# Patient Record
Sex: FEMALE | Race: WHITE | NOT HISPANIC OR LATINO | Employment: UNEMPLOYED | URBAN - METROPOLITAN AREA
[De-identification: names, ages, dates, MRNs, and addresses within clinical notes are randomized per-mention and may not be internally consistent; named-entity substitution may affect disease eponyms.]

---

## 2017-02-15 ENCOUNTER — HOSPITAL ENCOUNTER (EMERGENCY)
Facility: HOSPITAL | Age: 52
Discharge: HOME | End: 2017-02-16
Payer: MEDICAID

## 2017-02-15 DIAGNOSIS — S89.92XA LEFT KNEE INJURY, INITIAL ENCOUNTER: ICD-10-CM

## 2017-02-15 DIAGNOSIS — M25.569 KNEE PAIN, ACUTE: Primary | ICD-10-CM
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
## 2017-02-15 PROCEDURE — 63600175 PHARM REV CODE 636 W HCPCS: Performed by: NURSE PRACTITIONER

## 2017-02-15 PROCEDURE — 29505 APPLICATION LONG LEG SPLINT: CPT | Mod: LT

## 2017-02-15 PROCEDURE — 96372 THER/PROPH/DIAG INJ SC/IM: CPT

## 2017-02-15 PROCEDURE — 99283 EMERGENCY DEPT VISIT LOW MDM: CPT | Mod: 25

## 2017-02-15 RX ORDER — MORPHINE SULFATE 4 MG/ML
8 INJECTION, SOLUTION INTRAMUSCULAR; INTRAVENOUS
Status: COMPLETED | OUTPATIENT
Start: 2017-02-15 | End: 2017-02-15

## 2017-02-15 RX ORDER — PROMETHAZINE HYDROCHLORIDE 25 MG/ML
25 INJECTION, SOLUTION INTRAMUSCULAR; INTRAVENOUS
Status: COMPLETED | OUTPATIENT
Start: 2017-02-15 | End: 2017-02-15

## 2017-02-15 RX ORDER — MELOXICAM 15 MG/1
15 TABLET ORAL DAILY
Qty: 30 TABLET | Refills: 0 | Status: SHIPPED | OUTPATIENT
Start: 2017-02-15

## 2017-02-15 RX ADMIN — PROMETHAZINE HYDROCHLORIDE 25 MG: 25 INJECTION, SOLUTION INTRAMUSCULAR; INTRAVENOUS at 11:02

## 2017-02-15 RX ADMIN — MORPHINE SULFATE 8 MG: 4 INJECTION, SOLUTION INTRAMUSCULAR; INTRAVENOUS at 11:02

## 2017-02-15 NOTE — ED AVS SNAPSHOT
[facility]  [address] [facility] [address]               Chelsy Acosta   2/15/2017 11:18 PM   ED    Description:  Female : 1965   Department:  [facility]           Your Care was Coordinated By:     Provider Role From To    Reggie Calvo NP Nurse Practitioner 02/15/17 3352 --      Reason for Visit     Knee Pain           Diagnoses this Visit        Comments    Knee pain, acute    -  Primary     Left knee injury, initial encounter         Fall, initial encounter           ED Disposition     ED Disposition Condition Comment    Discharge             To Do List           Follow-up Information     Follow up with Scott Varghese MD In 2 days.    Specialty:  Family Medicine    Contact information:    [address] [facility] [address] [phone]          Schedule an appointment as soon as possible for a visit with Dawna - Orthopedics.    Specialty:  Orthopedics    Contact information:    [address] [facility] [address] [phone]    Additional information:    ([address]) 1st floor       These Medications        Disp Refills Start End    meloxicam (MOBIC) 15 MG tablet 30 tablet 0 2/15/2017     Take 1 tablet (15 mg total) by mouth once daily. With breakfast - Oral    Pharmacy: [facility] [id number] [facility] - [place], [place] -  [facility] #: [phone]         [facility]     [facility] -  Assistance  Registered nurses in the [facility] provide clinical advisement, health education, appointment booking, and other advisory services.  Call for this free service at [phone].             Medications           Message regarding Medications     Verify the changes and/or additions to your medication regime listed below are the same as discussed with your clinician today.  If any of these changes or additions are incorrect, please notify your healthcare  provider.        START taking these NEW medications        Refills    meloxicam (MOBIC) 15 MG tablet 0    Sig: Take 1 tablet (15 mg total) by mouth once daily. With breakfast    Class: Print    Route: Oral      These medications were administered today        Dose Freq    morphine injection 8 mg 8 mg ED 1 Time    Sig: Inject 2 mLs (8 mg total) into the muscle ED 1 Time.    Class: Normal    Route: Intramuscular    Cosign for Ordering: Required by Harmony Blunt MD    promethazine injection 25 mg 25 mg ED 1 Time    Sig: Inject 1 mL (25 mg total) into the muscle ED 1 Time.    Class: Normal    Route: Intramuscular           Verify that the below list of medications is an accurate representation of the medications you are currently taking.  If none reported, the list may be blank. If incorrect, please contact your healthcare provider. Carry this list with you in case of emergency.           Current Medications     albuterol (PROAIR HFA) 90 mcg/actuation inhaler Inhale 2 puffs into the lungs every 4 (four) hours as needed.    budesonide-formoterol 160-4.5 mcg (SYMBICORT) 160-4.5 mcg/actuation HFAA Inhale 2 puffs into the lungs every 12 (twelve) hours.    meloxicam (MOBIC) 15 MG tablet Take 1 tablet (15 mg total) by mouth once daily. With breakfast    morphine injection 8 mg Inject 2 mLs (8 mg total) into the muscle ED 1 Time.    promethazine injection 25 mg Inject 1 mL (25 mg total) into the muscle ED 1 Time.           Clinical Reference Information           Your Vitals Were     BP Pulse Temp Resp Height Weight    138/72 (BP Location: Right arm, Patient Position: Sitting) 89 98.4 °F (36.9 °C) (Oral) 20 5' (1.524 m) 77.1 kg (170 lb)    SpO2 BMI             96% 33.2 kg/m2         Allergies as of 2/15/2017     No Known Allergies      Immunizations Administered on Date of Encounter - 2/15/2017     None      ED Micro, Lab, POCT     None      ED Imaging Orders     Start Ordered       Status Ordering Provider    02/15/17 3734  02/15/17 2246  X-Ray Knee Complete 4 or More Views Left  1 time imaging      Final result         Discharge Instructions         Reducing Knee Pain and Swelling    Many treatments can help reduce pain and swelling in your knee. Your healthcare provider or physical therapist may suggest one or more of the following treatments:  · Icing your knee helps reduce swelling. You may be asked to ice your knee once a day or more. Apply ice for about 15 to 20 minutes at a time, with at least 40 minutes between sessions. Always keep a towel between the ice and your skin.   · Keeping your leg raised above your heart helps excess fluid flow out of your knee joint. This reduces swelling.  · Compression means wrapping an elastic bandage or neoprene sleeve snugly around your knees. This keeps fluid from collecting in your knee joint.  · Electrical stimulation, done by a physical therapist or , can help reduce excess fluid in your knee joint.  · Anti-inflammatory medicines may be prescribed by your healthcare provider. You may take pills or receive injections in your knee.  · Isometric (herber) exercises strengthen the muscles that support your knee joint. They also help reduce excess fluid in your knee.  · Massage helps fluid drain away from your knee.  Date Last Reviewed: 10/13/2015  © 2378-3935 [employer]. [address] [place] [address], [place] [address]. All rights reserved. This information is not intended as a substitute for professional medical care. Always follow your healthcare professional's instructions.          RICE     Rest an injury, elevate it, and use ice and compression as directed.   RICE stands for rest, ice, compression, and elevation. These can limit pain and swelling after an injury. RICE may be recommended to help treat fractures, sprains, strains, and bruises or bumps.   Home care  The following explain the details of RICE:  · Rest. Limit the use of the injured body part. This  helps prevent further damage to the body part and gives it time to heal. In some cases, you may need a sling, brace, splint, or cast to help keep the body part still until it has healed.  · Ice. Applying ice right after an injury helps relieve pain and swelling. Wrap a bag of ice in a thin towel. Then, place it over the injured area. Do this for 10 to 15 minutes every 3 to 4 hours. Continue for the next 1 to 3 days or until your symptoms improve. Never put ice directly on your skin or ice an area longer than 15 minutes at a time.  · Compression. Putting pressure on an injury helps reduce swelling and provides support. Wrap the injured area firmly with an elastic bandage/wrap. Make sure not to wrap the bandage too tightly or you will cut off blood flow to the injured area. If your bandage loosens, rewrap it.  · Elevation. Keeping an injury raised above the level of your heart reduces swelling, pain, and throbbing. For instance, if you have a broken leg, it may help to rest your leg on several pillows when sitting or lying down. Try to keep the injured area elevated for at least 2 to 3 hours per day.  Follow-up care  Follow up with your healthcare provider, or as advised.  When to seek medical advice  Call your healthcare provider right away if any of these occur:  · Fever of 100.4°F (38°C) or higher, or as directed by your healthcare provider  · Increased pain or swelling in the injured body part  · Injured body part becomes cold, blue, numb, or tingly  · Signs of infection. These include warmth in the skin, redness, drainage, or bad smell coming from the injured body part.  Date Last Reviewed: 1/18/2016  © 9567-6823 [employer]. [address] [place] [address], [place] [address]. All rights reserved. This information is not intended as a substitute for professional medical care. Always follow your healthcare professional's instructions.          [facility] Sign-Up     Activating your [facility] account is as easy  as 1-2-3!     1) Visit [email or web address], select Sign Up Now, enter this activation code and your date of birth, then select Next.  [email or web address]  Expires: 4/1/2017 11:36 PM      2) Create a username and password to use when you visit [facility] in the future and select a security question in case you lose your password and select Next.    3) Enter your e-mail address and click Sign Up!    Additional Information  If you have questions, please e-mail [email or web address] or call [phone] to talk to our [email or web address] staff. Remember, [facility] is NOT to be used for urgent needs. For medical emergencies, dial 911.          [facility] complies with applicable Federal civil rights laws and does not discriminate on the basis of race, color, national origin, age, disability, or sex.        Language Assistance Services     ATTENTION: Language assistance services are available, free of charge. Please call [phone].      ATENCIÓN: Si habla español, tiene a greco disposición servicios gratuitos de asistencia lingüística. Llame al [phone].     CHÚ Ý: N?u b?n nói Ti?ng Vi?t, có các d?ch v? h? tr? ngôn ng? mi?n phí dành cho b?n. G?i s? [phone].

## 2017-02-16 VITALS
SYSTOLIC BLOOD PRESSURE: 126 MMHG | RESPIRATION RATE: 18 BRPM | WEIGHT: 170 LBS | HEIGHT: 60 IN | DIASTOLIC BLOOD PRESSURE: 70 MMHG | BODY MASS INDEX: 33.38 KG/M2 | TEMPERATURE: 98 F | HEART RATE: 72 BPM | OXYGEN SATURATION: 98 %

## 2017-02-16 NOTE — DISCHARGE INSTRUCTIONS
Reducing Knee Pain and Swelling    Many treatments can help reduce pain and swelling in your knee. Your healthcare provider or physical therapist may suggest one or more of the following treatments:  · Icing your knee helps reduce swelling. You may be asked to ice your knee once a day or more. Apply ice for about 15 to 20 minutes at a time, with at least 40 minutes between sessions. Always keep a towel between the ice and your skin.   · Keeping your leg raised above your heart helps excess fluid flow out of your knee joint. This reduces swelling.  · Compression means wrapping an elastic bandage or neoprene sleeve snugly around your knees. This keeps fluid from collecting in your knee joint.  · Electrical stimulation, done by a physical therapist or , can help reduce excess fluid in your knee joint.  · Anti-inflammatory medicines may be prescribed by your healthcare provider. You may take pills or receive injections in your knee.  · Isometric (herber) exercises strengthen the muscles that support your knee joint. They also help reduce excess fluid in your knee.  · Massage helps fluid drain away from your knee.  Date Last Reviewed: 10/13/2015  © 5552-5305 [employer]. [address] [place] [address]. All rights reserved. This information is not intended as a substitute for professional medical care. Always follow your healthcare professional's instructions.          RICE     Rest an injury, elevate it, and use ice and compression as directed.   RICE stands for rest, ice, compression, and elevation. These can limit pain and swelling after an injury. RICE may be recommended to help treat fractures, sprains, strains, and bruises or bumps.   Home care  The following explain the details of RICE:  · Rest. Limit the use of the injured body part. This helps prevent further damage to the body part and gives it time to heal. In some cases, you may need a sling, brace, splint, or  cast to help keep the body part still until it has healed.  · Ice. Applying ice right after an injury helps relieve pain and swelling. Wrap a bag of ice in a thin towel. Then, place it over the injured area. Do this for 10 to 15 minutes every 3 to 4 hours. Continue for the next 1 to 3 days or until your symptoms improve. Never put ice directly on your skin or ice an area longer than 15 minutes at a time.  · Compression. Putting pressure on an injury helps reduce swelling and provides support. Wrap the injured area firmly with an elastic bandage/wrap. Make sure not to wrap the bandage too tightly or you will cut off blood flow to the injured area. If your bandage loosens, rewrap it.  · Elevation. Keeping an injury raised above the level of your heart reduces swelling, pain, and throbbing. For instance, if you have a broken leg, it may help to rest your leg on several pillows when sitting or lying down. Try to keep the injured area elevated for at least 2 to 3 hours per day.  Follow-up care  Follow up with your healthcare provider, or as advised.  When to seek medical advice  Call your healthcare provider right away if any of these occur:  · Fever of 100.4°F (38°C) or higher, or as directed by your healthcare provider  · Increased pain or swelling in the injured body part  · Injured body part becomes cold, blue, numb, or tingly  · Signs of infection. These include warmth in the skin, redness, drainage, or bad smell coming from the injured body part.  Date Last Reviewed: 1/18/2016  © 9322-2265 [employer]. [address] [place] [address], [place] [address]. All rights reserved. This information is not intended as a substitute for professional medical care. Always follow your healthcare professional's instructions.

## 2017-02-16 NOTE — ED PROVIDER NOTES
HISTORY     Chief Complaint   Patient presents with    Knee Pain     Trip/fall on L knee, significant surgical hx to L knee. +Swelling noted. Denies hitting head, no LOC     Review of patient's allergies indicates:  No Known Allergies     HPI   Patient is a 51 y.o. female presenting with the following complaint: fall. The history is provided by the patient.   Fall   The fall occurred while walking. She landed on a hard floor. The point of impact was the left knee. The pain is at a severity of 10/10. She was ambulatory at the scene. There was no entrapment after the fall. There was drug use (prescribed narcotics ) involved in the accident. There was no alcohol use involved in the accident. Pertinent negatives include no back pain, no fever and no nausea. The symptoms are aggravated by activity, use of the injured limb and pressure on the injury.        PCP: Scott Varghese MD     Past Medical History:  Past Medical History   Diagnosis Date    Asthma 11/22/2013    Chest congestion     Pneumonia         Past Surgical History:  No past surgical history on file.     Family History:  No family history on file.     Social History:  Social History     Social History Main Topics    Smoking status: Never Smoker    Smokeless tobacco: Not on file    Alcohol use No    Drug use: No    Sexual activity: No         ROS   Review of Systems   Constitutional: Negative for fever.   HENT: Negative for sore throat.    Respiratory: Negative for shortness of breath.    Cardiovascular: Negative for chest pain.   Gastrointestinal: Negative for nausea.   Genitourinary: Negative for dysuria.   Musculoskeletal: Negative for back pain.        Left knee pain/injury   Skin: Negative for rash.   Neurological: Negative for weakness.   Hematological: Does not bruise/bleed easily.       PHYSICAL EXAM   Initial Vitals   BP Pulse Resp Temp SpO2   02/15/17 2236 02/15/17 2236 02/15/17 2236 02/15/17 2236 02/15/17 2236   138/72 89 20 98.4 °F  (36.9 °C) 96 %       Physical Exam    Constitutional: She appears well-developed and well-nourished. No distress.   HENT:   Head: Normocephalic and atraumatic.   Eyes: Conjunctivae are normal. Pupils are equal, round, and reactive to light.   Neck: Normal range of motion. Neck supple.   Cardiovascular: Normal rate, regular rhythm, normal heart sounds and intact distal pulses.   Pulmonary/Chest: Breath sounds normal.   Abdominal: Soft. Bowel sounds are normal. She exhibits no distension. There is no tenderness. There is no rebound.   Musculoskeletal: She exhibits no edema.        Left knee: She exhibits decreased range of motion (due to pain), swelling and effusion. Tenderness found.   Neurological: She is alert and oriented to person, place, and time. She has normal strength.   Skin: Skin is warm and dry.   Psychiatric: She has a normal mood and affect.          ED COURSE   Orthopedic Injury  Date/Time: 2/15/2017 11:39 PM  Authorized by: CJ WEISS   Performed by: CJ WEISS  Injury location: knee  Location details: left knee  Injury type: soft tissue  Pre-procedure neurovascular assessment: neurovascularly intact  Pre-procedure range of motion: normal  Immobilization: brace and crutches  Supplies used: knee immoblizer   Complications: No  Specimens: No  Implants: No  Post-procedure neurovascular assessment: post-procedure neurovascularly intact  Patient tolerance: Patient tolerated the procedure well with no immediate complications        ED ONGOING VITALS:  Vitals:    02/15/17 2236   BP: 138/72   Pulse: 89   Resp: 20   Temp: 98.4 °F (36.9 °C)   TempSrc: Oral   SpO2: 96%   Weight: 77.1 kg (170 lb)   Height: 5' (1.524 m)         ABNORMAL LAB VALUES:  Labs Reviewed - No data to display      ALL LAB VALUES:        RADIOLOGY STUDIES:  Imaging Results         X-Ray Knee Complete 4 or More Views Left (Final result) Result time:  02/15/17 23:14:07    Final result by DENISE Oropeza Sr., MD (02/15/17 23:14:07)     Impression:      1.  There are small spurs in the medial compartment of the left knee.  This is characteristic of osteoarthritis.  2.  There is chondrocalcinosis of the lateral meniscus.  This is characteristic of CPPD.  3.  There is a small joint effusion in the left knee.        Electronically signed by: DENISE MARY MD  Date:     02/15/17  Time:    23:14     Narrative:    4 view x-ray of the left knee    Clinical History:     Pain in unspecified knee    Findings:     There is no fracture. There is no dislocation.  There is chondrocalcinosis of the lateral meniscus.  There are small spurs in the medial compartment of the left knee.  There is a small joint effusion in the left knee.                        The above vital signs and test results have been reviewed by the emergency provider.     ED Medications:  Medications   morphine injection 8 mg (8 mg Intramuscular Given 2/15/17 2340)   promethazine injection 25 mg (25 mg Intramuscular Given 2/15/17 2340)       Discharge Medications:  New Prescriptions    MELOXICAM (MOBIC) 15 MG TABLET    Take 1 tablet (15 mg total) by mouth once daily. With breakfast      Follow-up Information     Follow up with Scott Varghese MD In 2 days.    Specialty:  Family Medicine    Contact information:    [address] [facility] [address] [phone]          Schedule an appointment as soon as possible for a visit with Dawna - Orthopedics.    Specialty:  Orthopedics    Contact information:    [address] [facility] [address] [phone]    Additional information:    ([address]) 1st floor         I discussed with patient and/or family/caretaker that evaluation in the ED does not suggest any emergent or life threatening medical conditions requiring immediate intervention beyond what was provided in the ED, and I believe patient is safe for discharge. Regardless, an unremarkable evaluation in the ED does not preclude the development or presence of a  serious or life threatening condition. As such, patient was instructed to return immediately for any worsening or change in current symptoms.    Pre-hypertension/Hypertension: The pt has been informed that they may have pre-hypertension or hypertension based on a blood pressure reading in the ED. I recommend that the pt call the PCP listed on their discharge instructions or a physician of their choice this week to arrange f/u for further evaluation of possible pre-hypertension or hypertension.       MEDICAL DECISION MAKING                 CLINICAL IMPRESSION       ICD-10-CM ICD-9-CM   1. Knee pain, acute M25.569 719.46   2. Left knee injury, initial encounter S89.92XA 959.7   3. Fall, initial encounter W19.XXXA E888.9       Disposition:   Disposition: Discharged  Condition: Stable         Reggie Calvo NP  02/15/17 3174

## 2023-07-17 ENCOUNTER — HOSPITAL ENCOUNTER (EMERGENCY)
Facility: HOSPITAL | Age: 58
Discharge: HOME | End: 2023-07-17
Attending: EMERGENCY MEDICINE
Payer: MEDICAID

## 2023-07-17 VITALS
RESPIRATION RATE: 16 BRPM | BODY MASS INDEX: 36.62 KG/M2 | HEART RATE: 77 BPM | TEMPERATURE: 98 F | OXYGEN SATURATION: 99 % | DIASTOLIC BLOOD PRESSURE: 85 MMHG | SYSTOLIC BLOOD PRESSURE: 168 MMHG | WEIGHT: 187.5 LBS

## 2023-07-17 DIAGNOSIS — S00.83XA CONTUSION OF FACE, INITIAL ENCOUNTER: Primary | ICD-10-CM

## 2023-07-17 PROCEDURE — 99284 EMERGENCY DEPT VISIT MOD MDM: CPT | Mod: 25

## 2023-07-17 NOTE — FIRST PROVIDER EVALUATION
Medical screening examination initiated.  I have conducted a focused provider triage encounter, findings are as follows:    Brief history of present illness:  Felt a rock strike her right nostril while mowing Friday.  She feels like fb in face just below right nostril.  PATIENT ALREADY HAD A CT SCAN Saturday BUT INSISTS ON ANOTHER    There were no vitals filed for this visit.    Pertinent physical exam:  nad, alert.        Preliminary workup initiated; this workup will be continued and followed by the physician or advanced practice provider that is assigned to the patient when roomed.

## 2023-07-17 NOTE — ED PROVIDER NOTES
History      Chief Complaint   Patient presents with    Foreign Body in Nose     Pt believes a rock may have flew into nose( right nostril) while cutting grass. Reports something feels wrong to right side of face. No bleeding.       Review of patient's allergies indicates:  No Known Allergies     HPI   HPI    7/17/2023, 1:29 PM   History obtained from the patient      History of Present Illness: Chelsy Acosta is a 58 y.o. female patient who presents to the Emergency Department for concern that she has a rock in her face.  She felt a rock strike her right nostril while mowing Friday.  She feels like fb in face just below right nostril.  PATIENT ALREADY HAD A CT SCAN SATURDAY BUT INSISTS ON ANOTHER.    No further complaints or concerns at this time.           PCP: Scott Varghese MD       Past Medical History:  Past Medical History:   Diagnosis Date    Asthma 11/22/2013    Chest congestion     Pneumonia          Past Surgical History:  No past surgical history on file.        Family History:  No family history on file.        Social History:  Social History     Tobacco Use    Smoking status: Never    Smokeless tobacco: Not on file   Substance and Sexual Activity    Alcohol use: No    Drug use: No    Sexual activity: Never     Birth control/protection: None       ROS     Review of Systems   HENT:  Negative for rhinorrhea.         Facial pain     Physical Exam      Initial Vitals [07/17/23 1123]   BP Pulse Resp Temp SpO2   (!) 168/85 77 16 97.9 °F (36.6 °C) 99 %      MAP       --         Physical Exam  Vital signs and nursing notes reviewed.  Constitutional: Patient is in NAD. Awake and alert. Well-developed and well-nourished.  Head: Atraumatic. Normocephalic.  Eyes:  EOM intact. Conjunctivae nl. No scleral icterus.  ENT: Mucous membranes are moist. Oropharynx is clear.  No rock or visible wound in nose or to face  Neck: Supple.  No meningismus  Cardiovascular: Regular rate and rhythm. No murmurs, rubs, or  gallops. Distal pulses are 2+ and symmetric.  Pulmonary/Chest: No respiratory distress. Clear to auscultation bilaterally. No wheezing, rales, or rhonchi.  Musculoskeletal: Moves all extremities. No edema.   Skin: Warm and dry.  Neurological: Awake and alert. No acute focal neurological deficits are appreciated.  Psychiatric: Normal affect. Good eye contact. Appropriate in content.      ED Course          Procedures  ED Vital Signs:  Vitals:    07/17/23 1123   BP: (!) 168/85   Pulse: 77   Resp: 16   Temp: 97.9 °F (36.6 °C)   TempSrc: Oral   SpO2: 99%   Weight: 85.1 kg (187 lb 8 oz)                 Imaging Results:  Imaging Results              CT Maxillofacial Without Contrast (Final result)  Result time 07/17/23 11:54:58      Final result by Violet Quintanilla MD (Timothy) (07/17/23 11:54:58)                   Impression:      Negative for acute fracture.    All CT scans at this facility use dose modulation, iterative reconstructions, and/or weight base dosing when appropriate to reduce radiation dose to as low as reasonably achievable.      Electronically signed by: Violet Quintanilla MD  Date:    07/17/2023  Time:    11:54               Narrative:    EXAMINATION:  CT MAXILLOFACIAL WITHOUT CONTRAST    CLINICAL HISTORY:  Facial trauma, penetrating;    TECHNIQUE:  Standard axial and coronal facial bone CT performed.    COMPARISON:  None    FINDINGS:  Midline nasal septum. No evidence of acute fracture. The sinuses appear clear.  No intraorbital hematoma or abnormality.                                       CT Head Without Contrast (Final result)  Result time 07/17/23 11:52:27      Final result by Violet Quintanilla MD (Timothy) (07/17/23 11:52:27)                   Impression:      Normal study.    All CT scans at this facility use dose modulation, iterative reconstructions, and/or weight base dosing when appropriate to reduce radiation dose to as low as reasonably achievable.      Electronically signed by: Violet Quintanilla  MD  Date:    07/17/2023  Time:    11:52               Narrative:    EXAMINATION:  CT HEAD WITHOUT CONTRAST    CLINICAL HISTORY:  Facial trauma, penetrating;    TECHNIQUE:  Noncontrast images    COMPARISON:  MRI brain, 08/09/2004    FINDINGS:  No intracranial acute hemorrhage or acute focal brain parenchymal abnormality is identified.  Calvarium is intact.                                         The Emergency Provider reviewed the vital signs and test results, which are outlined above.    ED Discussion             Medication(s) given in the ER:  Medications - No data to display         Follow-up Information       Schedule an appointment as soon as possible for a visit  with [facility].    Specialty: Otolaryngology  Contact information:  [id number] [facility] [id number] [phone]  Additional information:  Please park on the [address] side and use the Clinic entrance. Check in on the 3rd floor or use any kiosk for self check-in.                                  Medication List        ASK your doctor about these medications      albuterol 90 mcg/actuation inhaler  Commonly known as: PROAIR HFA  Inhale 2 puffs into the lungs every 4 (four) hours as needed.     budesonide-formoterol 160-4.5 mcg 160-4.5 mcg/actuation Hfaa  Commonly known as: SYMBICORT  Inhale 2 puffs into the lungs every 12 (twelve) hours.     meloxicam 15 MG tablet  Commonly known as: MOBIC  Take 1 tablet (15 mg total) by mouth once daily. With breakfast                  Medical Decision Making        All findings were reviewed with the patient/family in detail.   All remaining questions and concerns were addressed at that time.  Patient/family has been counseled regarding the need for follow-up as well as the indication to return to the emergency room should new or worrisome developments occur.        MDM                 Clinical Impression:        ICD-10-CM ICD-9-CM   1. Contusion of face, initial  encounter  S00.83XA 920               Herlinda Castillo PA-C  07/17/23 1334